# Patient Record
Sex: FEMALE | Race: WHITE
[De-identification: names, ages, dates, MRNs, and addresses within clinical notes are randomized per-mention and may not be internally consistent; named-entity substitution may affect disease eponyms.]

---

## 2017-04-13 ENCOUNTER — HOSPITAL ENCOUNTER (OUTPATIENT)
Dept: HOSPITAL 74 - FASU | Age: 72
Discharge: HOME | End: 2017-04-13
Attending: SURGERY
Payer: COMMERCIAL

## 2017-04-13 VITALS — BODY MASS INDEX: 25.8 KG/M2

## 2017-04-13 VITALS — TEMPERATURE: 97.8 F

## 2017-04-13 VITALS — SYSTOLIC BLOOD PRESSURE: 109 MMHG | DIASTOLIC BLOOD PRESSURE: 69 MMHG | HEART RATE: 71 BPM

## 2017-04-13 DIAGNOSIS — N62: Primary | ICD-10-CM

## 2017-04-13 PROCEDURE — 0HBV0ZZ EXCISION OF BILATERAL BREAST, OPEN APPROACH: ICD-10-PCS | Performed by: SURGERY

## 2017-04-13 NOTE — OP
Operative Note





- Note:


Operative Date: 04/13/17


Pre-Operative Diagnosis: symptomatic macromastia


Operation: Breast reductions bilateral


Findings: 


macarmastia of bilateral breasts


Post-Operative Diagnosis: Same as Pre-op


Surgeon: Ender العلي


Assistant: Ludy Bernard


Anesthesia: General


Specimens Removed: breast tissue


Estimated Blood Loss (mls): 100


Drains & Tubes with Location: bilateral penrose drains


Operative Report Dictated: Yes

## 2017-04-14 NOTE — OP
DATE OF OPERATION:  04/13/2017

 

SURGEON:  Ender العلي MD

 

ASSISTANT SURGEON:  Ludy Bernard PA-C

 

PREOPERATIVE DIAGNOSIS:  Bilateral symptomatic macromastia.

 

POSTOPERATIVE DIAGNOSIS:  Bilateral symptomatic macromastia.

 

OPERATIVE PROCEDURE:  Bilateral reduction mammoplasty.

 

OPERATIVE INDICATION:  Patient is a 71-year-old female who presents with complaint of

large breast size, asymmetry, and symptoms related to her breasts.  The risks and

benefits of surgical versus nonsurgical alternatives as well as the material

complications of a standard Wise pattern reduction mammoplasty were described to the

patient on multiple occasions preoperatively including today again in the holding

area with her  in attendance.  She was marked in the standing position today

preoperatively.  She agreed to the planned procedure.

 

OPERATIVE PROCEDURE IN DETAIL:  Patient was taken to the operating room, and after

induction of general anesthesia in supine position, both arms were extended and

padded.  Venodyne boots were placed.  The entire chest wall was prepped with

ChloraPrep solution over its entire extent, and then, after allowing topical

anesthesia and intubation, the patient was prepped and draped in the usual fashion.

 

At this point, attention was turned to the previously marked incisions which were

made in the standing position.  These were injected only in the incision area with 1%

local lidocaine anesthesia with 1:100,000 epinephrine.  At this point, incisions were

made after timeout down through the skin and subcutaneous tissue, and the

nipple-areolar complex was circumscribed with a number 42 nipple-areolar cutter.  The

standard Buckley pattern glandular pedicle central mound was dissected free.  The 8-cm

wide pedicle was dissected and kept attached to the chest wall, and then, large

blocks of tissue were removed from the lateral, superior, and central portions of the

right breast, weighing approximately 603 g, and then, the left breast symmetrically

on the opposite side of 472 g of tissue.  This was consistent with the right breast

being significantly larger.  Copious irrigation of both wounds was performed.  Good

viability of the nipple complex was seen.  Both pedicles were tacked into position

using 2-0 Vicryl sutures to centralize them and stabilize them.  The skin flaps were

then advanced and closed upon themselves in the usual fashion using 2-0 Vicryl

sutures on the deep tissue, 3-0 PDS suture on the deep dermal area, and a running

V-Loc suture on the skin and subcutaneous tissue.  A Penrose drain 1 inch was brought

out through the lateral portion of the wound, and a safety pin was used to ensure

that the drain did not dislodge.  All wounds were dressed sterilely with Mastisol,

Steri-Strips, and compressive dressing.  Good symmetry was seen in the sitting

position.  She tolerated the procedure well.  She was awakened, extubated, and

transferred to the recovery room in a Surgi-Bra.

 

 

BOBBY العلي M.D.

 

[id number]

DD: 04/13/2017 16:53

DT: 04/13/2017 17:24

Job #:  [id number]

## 2017-04-18 NOTE — PATH
Surgical Pathology Report



Patient Name:  BLANQUITA CHOW

Accession #:  

[facility]. Rec. #:  [id number]                                                        

   /Age/Gender:  1945 (Age: 71) / F

Account:  [id number]                                                          

             Location: [facility] AMBULATORY 

Taken:  2017

Received:  2017

Reported:  2017

Physicians:  Ender العلي

  



Specimen(s) Received

A: RIGHT BREAST SKIN AND TISSUE 

B: LEFT BREAST SKIN AND TISSUE 





Clinical History

Symptomatic macromastia







Final Diagnosis

A. SKIN AND TISSUE, RIGHT BREAST, REDUCTION:  

BENIGN BREAST TISSUE SHOWING SMALL INTRADUCTAL PAPILLOMA WITH EPITHELIAL

HYPERPLASIA AND SCLEROSIS, PROLIFERATIVE FIBROCYSTIC CHANGES INCLUDING CYSTIC

APOCRINE METAPLASIA AND USUAL DUCTAL HYPERPLASIA (UDH) AND FIBROADENOMATOID

CHANGES.

SKIN WITH NO PATHOLOGIC FINDINGS. 



B. SKIN AND TISSUE, LEFT BREAST, REDUCTION:  

BENIGN BREAST TISSUE SHOWING FIBROADENOMATOID CHANGES.

SKIN WITH NO PATHOLOGIC FINDINGS.





***Electronically Signed***

Christiana French M.D.





Gross Description

A. Received in formalin labeled "right breast skin and tissue," is a 671 g, 21.5

x 19.5 x 4.0 cm aggregate of multiple irregular, unoriented portions of

fibroadipose tissue and tan, unremarkable skin. Sectioning reveals multifocal

dense, focally firm fibrous tissue. Representative sections are submitted in 5

cassettes.



B. Received in formalin labeled "left breast skin and tissue," is a 535 g, 18.0

x 17.0 x 5.5 cm aggregate of multiple irregular, unoriented portions of

fibroadipose tissue and tan, unremarkable skin. Sectioning reveals multifocal

dense, focally firm fibrous tissue. Representative sections are submitted in 5

cassettes.

## 2023-05-04 PROBLEM — Z00.00 ENCOUNTER FOR PREVENTIVE HEALTH EXAMINATION: Status: ACTIVE | Noted: 2023-05-04

## 2024-03-06 ENCOUNTER — APPOINTMENT (OUTPATIENT)
Dept: OPHTHALMOLOGY | Facility: CLINIC | Age: 79
End: 2024-03-06
Payer: MEDICARE

## 2024-03-06 ENCOUNTER — NON-APPOINTMENT (OUTPATIENT)
Age: 79
End: 2024-03-06

## 2024-03-06 PROCEDURE — 92004 COMPRE OPH EXAM NEW PT 1/>: CPT

## 2024-03-06 PROCEDURE — 92020 GONIOSCOPY: CPT

## 2024-03-06 PROCEDURE — 76514 ECHO EXAM OF EYE THICKNESS: CPT

## 2024-04-12 ENCOUNTER — APPOINTMENT (OUTPATIENT)
Dept: OPHTHALMOLOGY | Facility: CLINIC | Age: 79
End: 2024-04-12

## 2024-04-17 ENCOUNTER — APPOINTMENT (OUTPATIENT)
Dept: OPHTHALMOLOGY | Facility: CLINIC | Age: 79
End: 2024-04-17
Payer: MEDICARE

## 2024-04-17 ENCOUNTER — NON-APPOINTMENT (OUTPATIENT)
Age: 79
End: 2024-04-17

## 2024-04-17 PROCEDURE — 92136 OPHTHALMIC BIOMETRY: CPT

## 2024-04-17 PROCEDURE — 92014 COMPRE OPH EXAM EST PT 1/>: CPT

## 2024-04-17 PROCEDURE — 92004 COMPRE OPH EXAM NEW PT 1/>: CPT

## 2024-04-17 PROCEDURE — 92025 CPTRIZED CORNEAL TOPOGRAPHY: CPT

## 2024-04-17 PROCEDURE — 92250 FUNDUS PHOTOGRAPHY W/I&R: CPT

## 2024-06-27 ENCOUNTER — APPOINTMENT (OUTPATIENT)
Dept: OPHTHALMOLOGY | Facility: AMBULATORY SURGERY CENTER | Age: 79
End: 2024-06-27

## 2024-06-27 ENCOUNTER — OUTPATIENT (OUTPATIENT)
Dept: OUTPATIENT SERVICES | Facility: HOSPITAL | Age: 79
LOS: 1 days | Discharge: HOME | End: 2024-06-27

## 2024-06-27 VITALS
DIASTOLIC BLOOD PRESSURE: 66 MMHG | OXYGEN SATURATION: 100 % | HEART RATE: 60 BPM | WEIGHT: 164.02 LBS | TEMPERATURE: 98 F | SYSTOLIC BLOOD PRESSURE: 143 MMHG | HEIGHT: 66 IN | RESPIRATION RATE: 16 BRPM

## 2024-06-27 VITALS
OXYGEN SATURATION: 98 % | HEART RATE: 60 BPM | RESPIRATION RATE: 16 BRPM | TEMPERATURE: 97 F | SYSTOLIC BLOOD PRESSURE: 108 MMHG | DIASTOLIC BLOOD PRESSURE: 52 MMHG

## 2024-06-27 DIAGNOSIS — Z98.890 OTHER SPECIFIED POSTPROCEDURAL STATES: Chronic | ICD-10-CM

## 2024-06-27 DIAGNOSIS — Z87.39 PERSONAL HISTORY OF OTHER DISEASES OF THE MUSCULOSKELETAL SYSTEM AND CONNECTIVE TISSUE: Chronic | ICD-10-CM

## 2024-06-27 PROCEDURE — 66984 XCAPSL CTRC RMVL W/O ECP: CPT | Mod: LT

## 2024-06-27 PROCEDURE — 6698F: CPT | Mod: LT

## 2024-06-27 DEVICE — LENS IOL ACRYSOF NAT CLR SA60WF 22.5D
Type: IMPLANTABLE DEVICE | Site: LEFT | Status: NON-FUNCTIONAL
Removed: 2024-06-27

## 2024-06-27 RX ORDER — TETRACAINE HCL 0.5 %
1 DROPS OPHTHALMIC (EYE) ONCE
Refills: 0 | Status: COMPLETED | OUTPATIENT
Start: 2024-06-27 | End: 2024-06-27

## 2024-06-27 RX ORDER — PHENYLEPHRINE HCL 2.5 %
1 DROPS OPHTHALMIC (EYE)
Refills: 0 | Status: COMPLETED | OUTPATIENT
Start: 2024-06-27 | End: 2024-06-27

## 2024-06-27 RX ORDER — TROPICAMIDE 0.5 %
1 DROPS OPHTHALMIC (EYE)
Refills: 0 | Status: COMPLETED | OUTPATIENT
Start: 2024-06-27 | End: 2024-06-27

## 2024-06-27 RX ORDER — OFLOXACIN 3 MG/ML
1 SOLUTION/ DROPS OPHTHALMIC
Refills: 0 | Status: COMPLETED | OUTPATIENT
Start: 2024-06-27 | End: 2024-06-27

## 2024-06-27 RX ORDER — ACETAMINOPHEN 325 MG
650 TABLET ORAL ONCE
Refills: 0 | Status: DISCONTINUED | OUTPATIENT
Start: 2024-06-27 | End: 2024-06-27

## 2024-06-27 RX ADMIN — Medication 1 DROP(S): at 09:45

## 2024-06-27 RX ADMIN — Medication 1 DROP(S): at 09:50

## 2024-06-27 RX ADMIN — OFLOXACIN 1 DROP(S): 3 SOLUTION/ DROPS OPHTHALMIC at 09:55

## 2024-06-27 RX ADMIN — Medication 1 DROP(S): at 09:55

## 2024-06-27 RX ADMIN — OFLOXACIN 1 DROP(S): 3 SOLUTION/ DROPS OPHTHALMIC at 09:50

## 2024-06-27 RX ADMIN — OFLOXACIN 1 DROP(S): 3 SOLUTION/ DROPS OPHTHALMIC at 09:45

## 2024-06-28 ENCOUNTER — APPOINTMENT (OUTPATIENT)
Dept: OPHTHALMOLOGY | Facility: CLINIC | Age: 79
End: 2024-06-28
Payer: MEDICARE

## 2024-06-28 ENCOUNTER — NON-APPOINTMENT (OUTPATIENT)
Age: 79
End: 2024-06-28

## 2024-06-28 PROBLEM — K21.9 GASTRO-ESOPHAGEAL REFLUX DISEASE WITHOUT ESOPHAGITIS: Chronic | Status: ACTIVE | Noted: 2024-06-27

## 2024-06-28 PROBLEM — I49.8 OTHER SPECIFIED CARDIAC ARRHYTHMIAS: Chronic | Status: ACTIVE | Noted: 2024-06-27

## 2024-06-28 PROCEDURE — 99024 POSTOP FOLLOW-UP VISIT: CPT

## 2024-06-30 PROBLEM — R09.82 POSTNASAL DRIP: Chronic | Status: ACTIVE | Noted: 2024-06-27

## 2024-06-30 PROBLEM — Z85.6 PERSONAL HISTORY OF LEUKEMIA: Chronic | Status: ACTIVE | Noted: 2024-06-27

## 2024-06-30 PROBLEM — G47.00 INSOMNIA, UNSPECIFIED: Chronic | Status: ACTIVE | Noted: 2024-06-27

## 2024-07-03 ENCOUNTER — APPOINTMENT (OUTPATIENT)
Dept: OPHTHALMOLOGY | Facility: CLINIC | Age: 79
End: 2024-07-03
Payer: MEDICARE

## 2024-07-03 ENCOUNTER — NON-APPOINTMENT (OUTPATIENT)
Age: 79
End: 2024-07-03

## 2024-07-03 PROCEDURE — 99024 POSTOP FOLLOW-UP VISIT: CPT

## 2024-07-07 RX ORDER — DEXTROSE MONOHYDRATE AND SODIUM CHLORIDE 5; .3 G/100ML; G/100ML
1000 INJECTION, SOLUTION INTRAVENOUS
Refills: 0 | Status: DISCONTINUED | OUTPATIENT
Start: 2024-07-08 | End: 2024-07-08

## 2024-07-08 ENCOUNTER — OUTPATIENT (OUTPATIENT)
Dept: OUTPATIENT SERVICES | Facility: HOSPITAL | Age: 79
LOS: 1 days | Discharge: HOME | End: 2024-07-08
Payer: MEDICARE

## 2024-07-08 ENCOUNTER — TRANSCRIPTION ENCOUNTER (OUTPATIENT)
Age: 79
End: 2024-07-08

## 2024-07-08 ENCOUNTER — APPOINTMENT (OUTPATIENT)
Dept: OPHTHALMOLOGY | Facility: AMBULATORY SURGERY CENTER | Age: 79
End: 2024-07-08

## 2024-07-08 VITALS
RESPIRATION RATE: 16 BRPM | OXYGEN SATURATION: 98 % | SYSTOLIC BLOOD PRESSURE: 125 MMHG | HEART RATE: 60 BPM | DIASTOLIC BLOOD PRESSURE: 57 MMHG | TEMPERATURE: 97 F

## 2024-07-08 VITALS
DIASTOLIC BLOOD PRESSURE: 56 MMHG | RESPIRATION RATE: 16 BRPM | WEIGHT: 164.46 LBS | HEIGHT: 66.5 IN | SYSTOLIC BLOOD PRESSURE: 117 MMHG | TEMPERATURE: 98 F | OXYGEN SATURATION: 97 % | HEART RATE: 52 BPM

## 2024-07-08 DIAGNOSIS — Z98.890 OTHER SPECIFIED POSTPROCEDURAL STATES: Chronic | ICD-10-CM

## 2024-07-08 DIAGNOSIS — Z87.39 PERSONAL HISTORY OF OTHER DISEASES OF THE MUSCULOSKELETAL SYSTEM AND CONNECTIVE TISSUE: Chronic | ICD-10-CM

## 2024-07-08 DIAGNOSIS — H26.9 UNSPECIFIED CATARACT: Chronic | ICD-10-CM

## 2024-07-08 LAB — GLUCOSE BLDC GLUCOMTR-MCNC: 102 MG/DL — HIGH (ref 70–99)

## 2024-07-08 PROCEDURE — 66984 XCAPSL CTRC RMVL W/O ECP: CPT | Mod: RT,79

## 2024-07-08 PROCEDURE — 6698F: CPT | Mod: RT,79

## 2024-07-08 DEVICE — LENS IOL ACRYSOF NAT CLR SA60WF 22.5D
Type: IMPLANTABLE DEVICE | Site: RIGHT | Status: NON-FUNCTIONAL
Removed: 2024-07-08

## 2024-07-08 RX ORDER — PHENYLEPHRINE HCL 2.5 %
1 DROPS OPHTHALMIC (EYE)
Refills: 0 | Status: COMPLETED | OUTPATIENT
Start: 2024-07-08 | End: 2024-07-08

## 2024-07-08 RX ORDER — ACETAMINOPHEN 325 MG
650 TABLET ORAL ONCE
Refills: 0 | Status: DISCONTINUED | OUTPATIENT
Start: 2024-07-08 | End: 2024-07-08

## 2024-07-08 RX ORDER — METFORMIN HYDROCHLORIDE 850 MG/1
0 TABLET, FILM COATED ORAL
Refills: 0 | DISCHARGE

## 2024-07-08 RX ORDER — TROPICAMIDE 0.5 %
1 DROPS OPHTHALMIC (EYE)
Refills: 0 | Status: COMPLETED | OUTPATIENT
Start: 2024-07-08 | End: 2024-07-08

## 2024-07-08 RX ORDER — OMEPRAZOLE 10 MG/1
0 CAPSULE, DELAYED RELEASE ORAL
Refills: 0 | DISCHARGE

## 2024-07-08 RX ORDER — OFLOXACIN 3 MG/ML
1 SOLUTION/ DROPS OPHTHALMIC
Refills: 0 | Status: COMPLETED | OUTPATIENT
Start: 2024-07-08 | End: 2024-07-08

## 2024-07-08 RX ORDER — TEMAZEPAM 30 MG/1
1 CAPSULE ORAL
Refills: 0 | DISCHARGE

## 2024-07-08 RX ORDER — ONDANSETRON HYDROCHLORIDE 2 MG/ML
4 INJECTION INTRAMUSCULAR; INTRAVENOUS ONCE
Refills: 0 | Status: DISCONTINUED | OUTPATIENT
Start: 2024-07-08 | End: 2024-07-08

## 2024-07-08 RX ORDER — TETRACAINE HCL 0.5 %
1 DROPS OPHTHALMIC (EYE) ONCE
Refills: 0 | Status: COMPLETED | OUTPATIENT
Start: 2024-07-08 | End: 2024-07-08

## 2024-07-08 RX ADMIN — OFLOXACIN 1 DROP(S): 3 SOLUTION/ DROPS OPHTHALMIC at 07:50

## 2024-07-08 RX ADMIN — Medication 1 DROP(S): at 07:55

## 2024-07-08 RX ADMIN — Medication 1 DROP(S): at 07:45

## 2024-07-08 RX ADMIN — OFLOXACIN 1 DROP(S): 3 SOLUTION/ DROPS OPHTHALMIC at 07:45

## 2024-07-08 RX ADMIN — OFLOXACIN 1 DROP(S): 3 SOLUTION/ DROPS OPHTHALMIC at 07:55

## 2024-07-08 RX ADMIN — Medication 1 DROP(S): at 07:50

## 2024-07-09 ENCOUNTER — APPOINTMENT (OUTPATIENT)
Dept: OPHTHALMOLOGY | Facility: CLINIC | Age: 79
End: 2024-07-09
Payer: MEDICARE

## 2024-07-09 ENCOUNTER — NON-APPOINTMENT (OUTPATIENT)
Age: 79
End: 2024-07-09

## 2024-07-09 PROCEDURE — 99024 POSTOP FOLLOW-UP VISIT: CPT

## 2024-07-16 ENCOUNTER — NON-APPOINTMENT (OUTPATIENT)
Age: 79
End: 2024-07-16

## 2024-07-16 ENCOUNTER — APPOINTMENT (OUTPATIENT)
Dept: OPHTHALMOLOGY | Facility: CLINIC | Age: 79
End: 2024-07-16
Payer: MEDICARE

## 2024-07-16 PROCEDURE — 99024 POSTOP FOLLOW-UP VISIT: CPT

## 2024-08-06 ENCOUNTER — NON-APPOINTMENT (OUTPATIENT)
Age: 79
End: 2024-08-06

## 2024-08-06 ENCOUNTER — APPOINTMENT (OUTPATIENT)
Dept: OPHTHALMOLOGY | Facility: CLINIC | Age: 79
End: 2024-08-06

## 2024-08-06 PROCEDURE — 99024 POSTOP FOLLOW-UP VISIT: CPT

## 2025-03-12 ENCOUNTER — APPOINTMENT (OUTPATIENT)
Facility: CLINIC | Age: 80
End: 2025-03-12

## (undated) DEVICE — GOWN ROYAL SILK XL

## (undated) DEVICE — KNIFE ALCON PARACENTESIS CLEARCUT SIDEPORT 1MM (YELLOW)

## (undated) DEVICE — CANNULA IRR ANT CHAMBER 30G

## (undated) DEVICE — GLV 7.5 PROTEXIS (WHITE)

## (undated) DEVICE — PACK ANTERIOR SEGMENT

## (undated) DEVICE — DRAPE MICROSCOPE KNOB COVER SMALL (2 PCS)

## (undated) DEVICE — SOL IRR BAG BSS 500ML

## (undated) DEVICE — PACK CENTURION 2.4MM

## (undated) DEVICE — SOL BALANCE SALT 15ML

## (undated) DEVICE — CAPSULE RETRACTOR MST

## (undated) DEVICE — KNIFE ALCON PARACENTESIS CLEARCUT SIDEPORT 1.2MM (YELLOW)

## (undated) DEVICE — SOL IRR BAL SALT 500ML

## (undated) DEVICE — SUT NYLON 10-0 12" CU-5